# Patient Record
Sex: MALE | URBAN - METROPOLITAN AREA
[De-identification: names, ages, dates, MRNs, and addresses within clinical notes are randomized per-mention and may not be internally consistent; named-entity substitution may affect disease eponyms.]

---

## 2021-08-11 ENCOUNTER — OFFICE VISIT (OUTPATIENT)
Dept: URBAN - METROPOLITAN AREA CLINIC 26 | Facility: CLINIC | Age: 46
End: 2021-08-11
Payer: COMMERCIAL

## 2021-08-11 DIAGNOSIS — H52.4 PRESBYOPIA: ICD-10-CM

## 2021-08-11 DIAGNOSIS — H00.011 HORDEOLUM, RIGHT UPPER LID: Primary | ICD-10-CM

## 2021-08-11 PROCEDURE — 92134 CPTRZ OPH DX IMG PST SGM RTA: CPT | Performed by: OPTOMETRIST

## 2021-08-11 PROCEDURE — 92004 COMPRE OPH EXAM NEW PT 1/>: CPT | Performed by: OPTOMETRIST

## 2021-08-11 RX ORDER — AMOXICILLIN AND CLAVULANATE POTASSIUM 875; 125 MG/1; 1/1
TABLET, FILM COATED ORAL
Qty: 14 | Refills: 0 | Status: ACTIVE
Start: 2021-08-11

## 2021-08-11 ASSESSMENT — INTRAOCULAR PRESSURE
OS: 16
OD: 15

## 2021-08-11 ASSESSMENT — VISUAL ACUITY
OS: 20/20
OD: 20/20

## 2021-08-11 ASSESSMENT — KERATOMETRY
OD: 43.75
OS: 43.88

## 2021-08-11 NOTE — IMPRESSION/PLAN
Impression: Hordeolum, right upper lid: H00.011. Plan: Recommend WC's QID OD and lid massage. Start augmentine 875 mg BID PO x 7 days. rtc 1 week for f/u.

## 2021-08-16 ENCOUNTER — OFFICE VISIT (OUTPATIENT)
Dept: URBAN - METROPOLITAN AREA CLINIC 26 | Facility: CLINIC | Age: 46
End: 2021-08-16
Payer: COMMERCIAL

## 2021-08-16 DIAGNOSIS — H00.11 CHALAZION RIGHT UPPER EYELID: Primary | ICD-10-CM

## 2021-08-16 PROCEDURE — 99213 OFFICE O/P EST LOW 20 MIN: CPT | Performed by: OPTOMETRIST

## 2021-08-16 ASSESSMENT — INTRAOCULAR PRESSURE
OD: 16
OS: 18

## 2021-08-16 NOTE — IMPRESSION/PLAN
Impression: Chalazion right upper eyelid: H00.11. Plan: infection resolved. continue augmentin as prescribed. continue warm compresses qid OD with lid massage. ok to call to schedule consult with oculoplastics if no resolution of chalazion, ~1month.

## 2024-08-29 ENCOUNTER — OFFICE VISIT (OUTPATIENT)
Dept: URBAN - METROPOLITAN AREA CLINIC 30 | Facility: CLINIC | Age: 49
End: 2024-08-29
Payer: COMMERCIAL

## 2024-08-29 DIAGNOSIS — H52.223 REGULAR ASTIGMATISM, BILATERAL: ICD-10-CM

## 2024-08-29 DIAGNOSIS — H43.813 VITREOUS DEGENERATION, BILATERAL: ICD-10-CM

## 2024-08-29 DIAGNOSIS — E11.9 TYPE 2 DIABETES MELLITUS WITHOUT COMPLICATIONS: Primary | ICD-10-CM

## 2024-08-29 PROCEDURE — 92134 CPTRZ OPH DX IMG PST SGM RTA: CPT

## 2024-08-29 PROCEDURE — 99204 OFFICE O/P NEW MOD 45 MIN: CPT

## 2024-08-29 ASSESSMENT — INTRAOCULAR PRESSURE
OD: 18
OS: 19

## 2024-08-29 ASSESSMENT — VISUAL ACUITY
OD: 20/20
OS: 20/20

## 2024-08-29 ASSESSMENT — KERATOMETRY
OS: 44.00
OD: 43.88